# Patient Record
Sex: MALE | Race: WHITE | Employment: OTHER | URBAN - METROPOLITAN AREA
[De-identification: names, ages, dates, MRNs, and addresses within clinical notes are randomized per-mention and may not be internally consistent; named-entity substitution may affect disease eponyms.]

---

## 2017-04-04 ENCOUNTER — HOSPITAL ENCOUNTER (OUTPATIENT)
Dept: OCCUPATIONAL THERAPY | Age: 67
Discharge: HOME | End: 2017-03-31
Admitting: FAMILY MEDICINE

## 2017-04-04 ENCOUNTER — HOSPITAL ENCOUNTER (OUTPATIENT)
Dept: OCCUPATIONAL THERAPY | Age: 67
Discharge: HOME | End: 2017-04-04
Admitting: FAMILY MEDICINE

## 2017-04-12 ENCOUNTER — HOSPITAL ENCOUNTER (OUTPATIENT)
Dept: OCCUPATIONAL THERAPY | Age: 67
Discharge: HOME | End: 2017-04-12
Admitting: FAMILY MEDICINE

## 2017-05-01 ENCOUNTER — HOSPITAL ENCOUNTER (OUTPATIENT)
Dept: OCCUPATIONAL THERAPY | Age: 67
Discharge: HOME | End: 2017-05-31
Attending: FAMILY MEDICINE | Admitting: FAMILY MEDICINE

## 2017-08-04 ENCOUNTER — HOSPITAL ENCOUNTER (OUTPATIENT)
Dept: OTHER | Age: 67
Discharge: HOME | End: 2017-08-04
Attending: PSYCHIATRY & NEUROLOGY | Admitting: PSYCHIATRY & NEUROLOGY

## 2017-08-04 LAB
ANION GAP SERPL CALCULATED.3IONS-SCNC: 13 MMOL/L (ref 3–16)
BUN BLDV-MCNC: 9 MG/DL (ref 7–20)
CALCIUM SERPL-MCNC: 9.9 MG/DL (ref 8.3–10.6)
CHLORIDE BLD-SCNC: 98 MMOL/L (ref 99–110)
CO2: 24 MMOL/L (ref 21–32)
CREAT SERPL-MCNC: <0.5 MG/DL (ref 0.8–1.3)
FOLATE: 15.67 NG/ML (ref 4.78–24.2)
GFR AFRICAN AMERICAN: >60
GFR NON-AFRICAN AMERICAN: >60
GLUCOSE BLD-MCNC: 93 MG/DL (ref 70–99)
POTASSIUM SERPL-SCNC: 4.3 MMOL/L (ref 3.5–5.1)
SODIUM BLD-SCNC: 135 MMOL/L (ref 136–145)
VITAMIN B-12: 355 PG/ML (ref 211–911)

## 2017-08-05 LAB — RPR: NORMAL

## 2017-10-02 PROBLEM — S72.001A CLOSED RIGHT HIP FRACTURE (HCC): Status: ACTIVE | Noted: 2017-10-02

## 2017-10-18 ENCOUNTER — CARE COORDINATION (OUTPATIENT)
Dept: CASE MANAGEMENT | Age: 67
End: 2017-10-18

## 2017-10-18 NOTE — CARE COORDINATION
[facility] Ortho Bundle:    Patient is included in the [facility] ortho bundle. Called and spoke with Dasha Contreras at [facility]. Patient continues in STR. Notified her patient is a bundle patient and will be included in weekly calls for updates and discharge planning. Izabela Olivas RN  Care Transition Coordinator  [phone] cell    Follow up appointments:    No future appointments.

## 2017-11-20 ENCOUNTER — CARE COORDINATION (OUTPATIENT)
Dept: CASE MANAGEMENT | Age: 67
End: 2017-11-20

## 2017-11-20 NOTE — CARE COORDINATION
Received a [facility] alert patient discharge home on 11/17/18. CC notified. Post acute care transition coordinator signing off.  PIPE MengN RN  Care Transition Coordinator  [phone]

## 2017-11-20 NOTE — CARE COORDINATION
[facility] ortho bundle:     Patient home from SNF on 11/17/2017. Called and spoke to patient. Reports he is doing \"peachy\" and that he is with his [facility] nurse at this time. Agreeable to call another time. Blas Briceño, RN  Care Transition Coordinator  [phone] cell    Follow up appointments:    No future appointments.

## 2017-11-30 ENCOUNTER — OFFICE VISIT (OUTPATIENT)
Dept: ORTHOPEDIC SURGERY | Age: 67
End: 2017-11-30

## 2017-11-30 DIAGNOSIS — S72.001D CLOSED FRACTURE OF RIGHT HIP WITH ROUTINE HEALING, SUBSEQUENT ENCOUNTER: Primary | ICD-10-CM

## 2017-11-30 PROCEDURE — 99024 POSTOP FOLLOW-UP VISIT: CPT | Performed by: ORTHOPAEDIC SURGERY

## 2017-12-01 ENCOUNTER — CARE COORDINATION (OUTPATIENT)
Dept: CASE MANAGEMENT | Age: 67
End: 2017-12-01

## 2017-12-27 ENCOUNTER — CARE COORDINATION (OUTPATIENT)
Dept: CASE MANAGEMENT | Age: 67
End: 2017-12-27

## 2018-01-02 RX ORDER — AMOXICILLIN AND CLAVULANATE POTASSIUM 875; 125 MG/1; MG/1
TABLET, FILM COATED ORAL
Qty: 6 TABLET | Refills: 0 | Status: SHIPPED | OUTPATIENT
Start: 2018-01-02 | End: 2018-05-02 | Stop reason: SDUPTHER

## 2018-01-02 RX ORDER — AMOXICILLIN AND CLAVULANATE POTASSIUM 875; 125 MG/1; MG/1
TABLET, FILM COATED ORAL
Qty: 6 TABLET | Refills: 0 | Status: SHIPPED | OUTPATIENT
Start: 2018-01-02 | End: 2018-01-02 | Stop reason: SDUPTHER

## 2018-05-02 RX ORDER — AMOXICILLIN AND CLAVULANATE POTASSIUM 875; 125 MG/1; MG/1
TABLET, FILM COATED ORAL
Qty: 6 TABLET | Refills: 0 | Status: SHIPPED | OUTPATIENT
Start: 2018-05-02 | End: 2018-07-16 | Stop reason: SDUPTHER

## 2018-07-16 RX ORDER — AMOXICILLIN AND CLAVULANATE POTASSIUM 875; 125 MG/1; MG/1
TABLET, FILM COATED ORAL
Qty: 6 TABLET | Refills: 0 | Status: SHIPPED | OUTPATIENT
Start: 2018-07-16 | End: 2018-08-05 | Stop reason: ALTCHOICE

## 2018-08-05 ENCOUNTER — APPOINTMENT (OUTPATIENT)
Dept: GENERAL RADIOLOGY | Age: 68
End: 2018-08-05
Payer: MEDICARE

## 2018-08-05 ENCOUNTER — HOSPITAL ENCOUNTER (EMERGENCY)
Age: 68
Discharge: HOME | End: 2018-08-05
Payer: MEDICARE

## 2018-08-05 VITALS
WEIGHT: 197 LBS | RESPIRATION RATE: 16 BRPM | OXYGEN SATURATION: 97 % | BODY MASS INDEX: 26.68 KG/M2 | DIASTOLIC BLOOD PRESSURE: 93 MMHG | HEIGHT: 72 IN | SYSTOLIC BLOOD PRESSURE: 163 MMHG | HEART RATE: 96 BPM | TEMPERATURE: 98.1 F
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
## 2018-08-05 DIAGNOSIS — M25.551 RIGHT HIP PAIN: ICD-10-CM

## 2018-08-05 PROCEDURE — 73502 X-RAY EXAM HIP UNI 2-3 VIEWS: CPT

## 2018-08-05 PROCEDURE — 6370000000 HC RX 637 (ALT 250 FOR IP): Performed by: NURSE PRACTITIONER

## 2018-08-05 PROCEDURE — 99283 EMERGENCY DEPT VISIT LOW MDM: CPT

## 2018-08-05 RX ORDER — HYDROCODONE BITARTRATE AND ACETAMINOPHEN 5; 325 MG/1; MG/1
1 TABLET ORAL EVERY 6 HOURS PRN
Qty: 20 TABLET | Refills: 0 | Status: SHIPPED | OUTPATIENT
Start: 2018-08-05 | End: 2018-08-12

## 2018-08-05 RX ORDER — HYDROCODONE BITARTRATE AND ACETAMINOPHEN 5; 325 MG/1; MG/1
1 TABLET ORAL ONCE
Status: COMPLETED | OUTPATIENT
Start: 2018-08-05 | End: 2018-08-05

## 2018-08-05 RX ADMIN — HYDROCODONE BITARTRATE AND ACETAMINOPHEN 1 TABLET: 5; 325 TABLET ORAL at 20:32

## 2018-08-05 ASSESSMENT — PAIN SCALES - GENERAL
PAINLEVEL_OUTOF10: 7
PAINLEVEL_OUTOF10: 7
PAINLEVEL_OUTOF10: 3

## 2018-08-06 NOTE — ED PROVIDER NOTES
PATIENT SEEN INDEPENDENT OF PHYSICIAN    CHIEF COMPLAINT  Fall (Pt reports fell approx 1800 today, was over extending reach and caused him to fall. Pt reports pain in right hip. Pt reports pain is worsening as time passes. )      HISTORY OF PRESENT ILLNESS  Jaydon Smith is a 79 y.o. male who presents to the ED for evaluation of a fall. Patient states that he fell around 6 PM today. Patient states that he was reaching out and he lost his balance. Patient has PSP. Patient does have a right hip prosthetic. He was able to ambulate after he fell. Patient states that his pain is gotten progressively worse. Patient is concerned about possible fracture. He denies any other injuries or complaints at this time. He is here for further evaluation. No other complaints, modifying factors or associated symptoms. Nursing notes reviewed. Past Medical History:   Diagnosis Date    Hypertension     Parkinson disease ([facility] [address]      Past Surgical History:   Procedure Laterality Date    BACK SURGERY      KNEE SURGERY Left     three seperate surgeries    OTHER SURGICAL HISTORY Right 10/02/2017    right hip marly arthroplasty     History reviewed. No pertinent family history. Social History     Social History    Marital status:      Spouse name: N/A    Number of children: N/A    Years of education: N/A     Occupational History    Not on file. Social History Main Topics    Smoking status: Current Every Day Smoker     Packs/day: 0.50     Types: Cigarettes    Smokeless tobacco: Never Used    Alcohol use Yes      Comment: whiskey once day    Drug use: No    Sexual activity: Not on file     Other Topics Concern    Not on file     Social History Narrative    No narrative on file     No current facility-administered medications for this encounter.       Current Outpatient Prescriptions   Medication Sig Dispense Refill    HYDROcodone-acetaminophen (NORCO) 5-325 MG per tablet Take 1 tablet by Reason for Exam: fall Acuity: Acute Type of Exam: Initial Acute injury in a fall. FINDINGS: Arterial vascular calcifications are seen within the soft tissues. Metallic right hip prosthesis is identified. Moderate degenerative changes of the lower lumbar spine are present. No acute fracture or dislocation is identified. No acute bony abnormality suspected. ED COURSE/MDM  Patient seen and evaluated here in the ER. Patient presented to the emergency Department today with complaint of right hip pain. Patient radiographic imaging showed no evidence of acute fracture. Patient was able to bear weight. Patient was given a short supply of pain medication. He was instructed to rest and ice. He can follow up with orthopedics. He was discharged in good condition. Patient was given scripts for the following medications. I counseled patient how to take these medications. Discharge Medication List as of 8/5/2018  8:26 PM      START taking these medications    Details   HYDROcodone-acetaminophen (NORCO) 5-325 MG per tablet Take 1 tablet by mouth every 6 hours as needed for Pain for up to 7 days. ., Disp-20 tablet, R-0Print                 CLINICAL IMPRESSION  1. Fall, initial encounter    2. Right hip pain        Blood pressure (!) 163/93, pulse 96, temperature 98.1 °F (36.7 °C), temperature source Oral, resp. rate 16, height 6' (1.829 m), weight 197 lb (89.4 kg), SpO2 97 %. DISPOSITION  Patient was discharged to home in good condition.        Joy Speaker, APRN - CNP  08/06/18 1209

## 2019-01-14 RX ORDER — AMOXICILLIN AND CLAVULANATE POTASSIUM 875; 125 MG/1; MG/1
1 TABLET, FILM COATED ORAL 2 TIMES DAILY
Qty: 6 TABLET | Refills: 0 | Status: SHIPPED | OUTPATIENT
Start: 2019-01-14 | End: 2019-01-17

## 2019-05-15 RX ORDER — AMOXICILLIN AND CLAVULANATE POTASSIUM 875; 125 MG/1; MG/1
1 TABLET, FILM COATED ORAL 2 TIMES DAILY
Qty: 6 TABLET | Refills: 0 | Status: SHIPPED | OUTPATIENT
Start: 2019-05-15 | End: 2019-05-18